# Patient Record
Sex: FEMALE | Race: WHITE | Employment: STUDENT | URBAN - METROPOLITAN AREA
[De-identification: names, ages, dates, MRNs, and addresses within clinical notes are randomized per-mention and may not be internally consistent; named-entity substitution may affect disease eponyms.]

---

## 2023-05-17 ENCOUNTER — OFFICE VISIT (OUTPATIENT)
Dept: PEDIATRICS | Facility: CLINIC | Age: 18
End: 2023-05-17
Payer: COMMERCIAL

## 2023-05-17 VITALS — BODY MASS INDEX: 32.4 KG/M2 | TEMPERATURE: 97.8 F | WEIGHT: 185.41 LBS

## 2023-05-17 DIAGNOSIS — J36 PERITONSILLAR CELLULITIS: Primary | ICD-10-CM

## 2023-05-17 LAB — POC RAPID STREP: NEGATIVE

## 2023-05-17 PROCEDURE — 4004F PT TOBACCO SCREEN RCVD TLK: CPT | Performed by: NURSE PRACTITIONER

## 2023-05-17 PROCEDURE — 99214 OFFICE O/P EST MOD 30 MIN: CPT | Performed by: NURSE PRACTITIONER

## 2023-05-17 PROCEDURE — 87081 CULTURE SCREEN ONLY: CPT

## 2023-05-17 PROCEDURE — 87880 STREP A ASSAY W/OPTIC: CPT | Performed by: NURSE PRACTITIONER

## 2023-05-17 RX ORDER — CLINDAMYCIN HYDROCHLORIDE 300 MG/1
300 CAPSULE ORAL 4 TIMES DAILY
Qty: 40 CAPSULE | Refills: 0 | Status: SHIPPED | OUTPATIENT
Start: 2023-05-17 | End: 2023-05-27

## 2023-05-17 RX ORDER — IBUPROFEN 800 MG/1
800 TABLET ORAL EVERY 8 HOURS PRN
COMMUNITY
Start: 2022-10-30

## 2023-05-17 RX ORDER — DULOXETIN HYDROCHLORIDE 20 MG/1
20 CAPSULE, DELAYED RELEASE ORAL EVERY EVENING
COMMUNITY

## 2023-05-17 RX ORDER — ALBUTEROL SULFATE 90 UG/1
AEROSOL, METERED RESPIRATORY (INHALATION) 4 TIMES DAILY
COMMUNITY
Start: 2018-12-11

## 2023-05-17 RX ORDER — CLINDAMYCIN HYDROCHLORIDE 300 MG/1
300 CAPSULE ORAL 3 TIMES DAILY
Qty: 30 CAPSULE | Refills: 0 | Status: SHIPPED | OUTPATIENT
Start: 2023-05-17 | End: 2023-05-17 | Stop reason: SDUPTHER

## 2023-05-17 RX ORDER — LORATADINE 10 MG/1
10 TABLET ORAL DAILY
COMMUNITY
Start: 2022-07-22

## 2023-05-17 ASSESSMENT — ENCOUNTER SYMPTOMS
SORE THROAT: 1
NAUSEA: 0
VOMITING: 0
TROUBLE SWALLOWING: 1
COUGH: 0

## 2023-05-17 NOTE — PROGRESS NOTES
Subjective   Patient ID: Gerri Zhu is a 17 y.o. female who presents for swollen tonsils.  Today she is accompanied by accompanied by  self .     Gerri is 17 yr old female here today for swollen L tonsil.    Went to [facility] ED in [place] 1 month ago (5/3/23) and was told she had an abscess.  CT done which showed a small Right Peritonsillar Abscess.  Took Augmentin as prescribed.    Did well until yesterday when she noticed swelling in Left tonsil.  No fever.    This time, she does not feel ill,  just has tenderness with swelling.  L side of neck swollen.            Review of Systems   HENT:  Positive for sore throat and trouble swallowing. Negative for congestion.    Respiratory:  Negative for cough.    Gastrointestinal:  Negative for nausea and vomiting.   All other systems reviewed and are negative.    Visit Vitals  Temp 36.6 °C (97.8 °F)          Objective   Temp 36.6 °C (97.8 °F)   Wt 84.1 kg (185 lb 6.5 oz)   BMI 32.40 kg/m²   BSA: 1.94 meters squared  Growth percentiles: No height on file for this encounter. 96 %ile (Z= 1.70) based on CDC (Girls, 2-20 Years) weight-for-age data using vitals from 5/17/2023.     Physical Exam  Vitals and nursing note reviewed.   Constitutional:       Appearance: Normal appearance.   HENT:      Head: Normocephalic.      Right Ear: Tympanic membrane, ear canal and external ear normal.      Left Ear: Tympanic membrane, ear canal and external ear normal.      Nose: Nose normal.      Mouth/Throat:      Mouth: Mucous membranes are moist.      Pharynx: Oropharyngeal exudate and posterior oropharyngeal erythema present.      Tonsils: Tonsillar exudate and tonsillar abscess present.      Comments: L sided swelling with very enlarged L cervical lymph nodes.   Eyes:      Conjunctiva/sclera: Conjunctivae normal.      Pupils: Pupils are equal, round, and reactive to light.   Cardiovascular:      Rate and Rhythm: Normal rate and regular rhythm.      Heart sounds: S1 normal and S2 normal. No  murmur heard.  Pulmonary:      Effort: Pulmonary effort is normal.      Breath sounds: Normal breath sounds and air entry.   Abdominal:      General: Abdomen is flat. There is no distension.      Palpations: Abdomen is soft.   Musculoskeletal:      Cervical back: Normal range of motion and neck supple.   Lymphadenopathy:      Cervical: Cervical adenopathy present.      Left cervical: Superficial cervical adenopathy present.   Skin:     General: Skin is warm.   Neurological:      General: No focal deficit present.      Mental Status: She is alert. Mental status is at baseline.   Psychiatric:         Mood and Affect: Mood normal.         Thought Content: Thought content normal.         Assessment/Plan   Problem List Items Addressed This Visit          Infectious/Inflammatory     Clindamycin 300 mg 4 times per day for 10 days  Follow up with ENT         Peritonsillar cellulitis - Primary    Relevant Medications    clindamycin (Cleocin HCL) 300 mg capsule    Other Relevant Orders    Referral to ENT

## 2023-05-17 NOTE — PATIENT INSTRUCTIONS
Clindamycin 4 times per day for 10 days  Start taking a probiotic daily  ENT for follow up   Rapid strep negative, culture pending.

## 2023-05-19 LAB — GROUP A STREP SCREEN, CULTURE: NORMAL

## 2025-01-21 ENCOUNTER — HOSPITAL ENCOUNTER (OUTPATIENT)
Dept: RADIOLOGY | Facility: CLINIC | Age: 20
Discharge: HOME | End: 2025-01-21
Payer: COMMERCIAL

## 2025-01-21 ENCOUNTER — APPOINTMENT (OUTPATIENT)
Dept: ORTHOPEDIC SURGERY | Facility: CLINIC | Age: 20
End: 2025-01-21
Payer: COMMERCIAL

## 2025-01-21 ENCOUNTER — OFFICE VISIT (OUTPATIENT)
Dept: ORTHOPEDIC SURGERY | Facility: CLINIC | Age: 20
End: 2025-01-21
Payer: COMMERCIAL

## 2025-01-21 DIAGNOSIS — M25.572 LEFT ANKLE PAIN, UNSPECIFIED CHRONICITY: ICD-10-CM

## 2025-01-21 DIAGNOSIS — M25.472 ANKLE EFFUSION, LEFT: Primary | ICD-10-CM

## 2025-01-21 PROCEDURE — 99214 OFFICE O/P EST MOD 30 MIN: CPT | Performed by: PHYSICIAN ASSISTANT

## 2025-01-21 PROCEDURE — 73610 X-RAY EXAM OF ANKLE: CPT | Mod: 50

## 2025-01-21 PROCEDURE — 73610 X-RAY EXAM OF ANKLE: CPT | Mod: BILATERAL PROCEDURE | Performed by: RADIOLOGY

## 2025-01-21 NOTE — PATIENT INSTRUCTIONS
You were ordered a MRI of the [].  Please call [phone] to schedule your MRI.  When your MRI is complete, please call the office at [phone] and leave your name and number.  We will call you back with your results    1. Follow stretching exercises that were on a separate handout   2. Hold each stretch for a least 1 minute  3. Do not bounce while stretching  4. Stretch for 10 minutes at a time, 3x a day for 6 weeks then daily  5. Remember, it takes several weeks to a few months of consistent stretching to increase flexibility and decrease symptoms.     You can use OTC Voltaren gel or aspercream and apply it to the injured area.    Ice and elevate supported at the calf with no pressure on the heel to reduce swelling.    Patient will avoid impact activities such as running or jumping.  They can use a stationary bike, pool exercises and upper body training.    Follow up pending MRI results

## 2025-01-21 NOTE — PROGRESS NOTES
History of Present Illness  19 y.o.female here for left ankle pain  1. Ankle effusion, left  MR ankle left wo IV contrast      2. Left ankle pain, unspecified chronicity  XR ankle bilateral complete minimum 3 views    CANCELED: XR ankle left 3+ views        Mechanism of injury: none; working out  Date of Injury/Pain: ~3-4 weeks ago  Location of pain: lateral ankle  Frequency of Pain: worse with walking or standing  Associated symptoms? Swelling.  Previous treatment?  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    Past Medical History:   Diagnosis Date    Acute upper respiratory infection, unspecified 05/18/2017    Viral URI with cough    Attention and concentration deficit     Attention disturbance    Excessive crying of child, adolescent or adult     Crying for unknown reason    Impulsiveness     Impulsive    Inadequate sleep hygiene     Inadequate sleep hygiene    Lower abdominal pain, unspecified 09/22/2016    Lower abdominal pain    Other conditions influencing health status     Behavior problems    Other specified behavioral and emotional disorders with onset usually occurring in childhood and adolescence     Nail biting    Other symptoms and signs involving appearance and behavior     Aggressive behavior    Pediculosis due to pediculus humanus capitis 12/06/2019    Head lice    Personal history of diseases of the skin and subcutaneous tissue     History of eczema    Personal history of other diseases of the digestive system 09/22/2016    History of constipation    Personal history of other diseases of the respiratory system 05/01/2018    History of streptococcal pharyngitis    Personal history of other diseases of the respiratory system 05/18/2017    History of sore throat    Personal history of other diseases of the respiratory system     Personal history of asthma    Personal history of other specified conditions     History of headache    Personal history of other specified conditions  11/19/2018    History of epistaxis    Personal history of other specified conditions 12/16/2015    History of hemoptysis    Restless legs syndrome     RLS (restless legs syndrome)    Stereotyped movement disorders     Head banging    Unspecified injury of unspecified wrist, hand and finger(s), initial encounter 01/30/2017    Finger injury        Allergies   Allergen Reactions    Apple Juice Rash        Past Surgical History:   Procedure Laterality Date    MYRINGOTOMY W/ TUBES  10/31/2014    Myringotomy - With Ventilating Tube Insertion        No family history on file.     Social History     Socioeconomic History    Marital status: Single     Spouse name: Not on file    Number of children: Not on file    Years of education: Not on file    Highest education level: Not on file   Occupational History    Not on file   Tobacco Use    Smoking status: Never    Smokeless tobacco: Current   Substance and Sexual Activity    Alcohol use: Not on file    Drug use: Not on file    Sexual activity: Not on file   Other Topics Concern    Not on file   Social History Narrative    Not on file     Social Drivers of Health     Financial Resource Strain: Not on file   Food Insecurity: Not on file   Transportation Needs: Not on file   Physical Activity: Not on file   Stress: Not on file   Social Connections: Not on file   Intimate Partner Violence: Not on file   Housing Stability: Not on file        CURRENT MEDICATIONS:   Current Outpatient Medications   Medication Sig Dispense Refill    albuterol 90 mcg/actuation inhaler Inhale 4 times a day.      DULoxetine (Cymbalta) 20 mg DR capsule Take 1 capsule (20 mg) by mouth once daily in the evening.      ibuprofen 800 mg tablet Take 1 tablet (800 mg) by mouth every 8 hours if needed.      loratadine (Claritin) 10 mg tablet Take 1 tablet (10 mg) by mouth once daily.       No current facility-administered medications for this visit.        REVIEW OF SYSTEMS  27 point review of systems negative  except what is stated in HPI    Constitutional Exam: patient's height and weight reviewed, well-kempt  Psychiatric Exam: alert and oriented x 3, appropriate mood and behavior  Eye Exam: JOHN, EOMI  Pulmonary Exam: breathing non-labored, no apparent distress  Lymphatic exam: no appreciable lymphadenopathy in the lower extremities  Cardiovascular exam: DP pulses 2+ bilaterally, PT 2+ bilaterally, toes are pink with good capillary refill, no pitting edema  Skin exam: no open lesions, rashes, abrasions or ulcerations  Neurological exam: sensation to light touch intact in both lower extremities in peripheral and dermatomal distributions (except for any abnormalities noted in musculoskeletal exam)    PHYSICAL EXAM  On examination of the left ankle/foot:  Antalgic gait  Normal alignment  +1 effusion of the ankle. No ecchymosis or erythema  decreased ROM in plantarflexion, dorsiflexion, inversion and eversion  Normal strength in plantarflexion, dorsiflexion, inversion and eversion.  Tenderness to palpation: none  No tenderness to palpation over the Achilles, medial malleolus, posterior tibial tendon, peroneal tendon, base of fifth metatarsal, deltoid ligament, talus or navicular.  Neurovascularly intact.  No sensory deficit to light touch.  Dorsalis pedis and posterior tibial pulses 2+ bilaterally.  Negative proprioception testing.   - Acevedo's test                              - Dorsiflexion-eversion test  - Anterior Drawer test                        - Talar tilt test  - Squeeze test     IMAGING  I personally reviewed the patient's x-ray images and reports of the left ankle. The xrays show no fractures or dislocation. Talar osteochondral defect.  Normal joint spacing         ASSESSMENT: left ankle effusion, talar osteochondral defect    PLAN: Treatment options were discussed with the patient. I certify medical necessity and need for MRI. Patient was ordered a MRI of the left ankle for talar osteochondral defect. They  will call to schedule the MRI and call the office once complete. The patient was given a prescription for physical therapy.  Physical therapy is medically necessary to improve strength, balance, range of motion and functional outcomes after injury and/or surgery. Patient was given a handout and instructed on an at home stretching program.  They should do these exercises 3 times per day for 6 weeks and then daily. Patient can use OTC aspercream for pain and continue to ice and elevate supported at the calf to reduce swelling. All the patient's questions were answered. The patient agrees with the above plan.  Follow up pending MRI results  Abrahan Bailey PA-C

## 2025-01-21 NOTE — LETTER
January 21, 2025     Patient: Gerri Zhu   YOB: 2005   Date of Visit: 1/21/2025       To Whom it May Concern:    Gerri Zhu was seen in my clinic on 1/21/2025.     If you have any questions or concerns, please don't hesitate to call.         Sincerely,          Abrahan Bailey PA-C        CC: No Recipients

## 2025-02-05 ENCOUNTER — HOSPITAL ENCOUNTER (OUTPATIENT)
Dept: RADIOLOGY | Facility: CLINIC | Age: 20
Discharge: HOME | End: 2025-02-05
Payer: COMMERCIAL

## 2025-02-05 DIAGNOSIS — M25.472 ANKLE EFFUSION, LEFT: ICD-10-CM

## 2025-02-05 PROCEDURE — 73721 MRI JNT OF LWR EXTRE W/O DYE: CPT | Mod: LT

## 2025-02-05 PROCEDURE — 73721 MRI JNT OF LWR EXTRE W/O DYE: CPT | Mod: LEFT SIDE | Performed by: STUDENT IN AN ORGANIZED HEALTH CARE EDUCATION/TRAINING PROGRAM

## 2025-05-20 LAB
NON-UH HIE GENPROBE CHLAMYDIA: NORMAL
NON-UH HIE GENPROBE GC: NORMAL

## 2025-05-21 LAB — NON-UH HIE GP TRICHOMONAS: NORMAL

## 2025-05-23 LAB — NON-UH HIE THINPREP TIS PAP: NORMAL

## 2025-07-01 ENCOUNTER — APPOINTMENT (OUTPATIENT)
Dept: ORTHOPEDIC SURGERY | Facility: CLINIC | Age: 20
End: 2025-07-01
Payer: COMMERCIAL

## 2025-07-01 DIAGNOSIS — M25.572 LEFT ANKLE PAIN, UNSPECIFIED CHRONICITY: ICD-10-CM
